# Patient Record
Sex: MALE | Race: WHITE
[De-identification: names, ages, dates, MRNs, and addresses within clinical notes are randomized per-mention and may not be internally consistent; named-entity substitution may affect disease eponyms.]

---

## 2017-10-01 ENCOUNTER — HOSPITAL ENCOUNTER (EMERGENCY)
Dept: HOSPITAL 92 - ERS | Age: 27
Discharge: HOME | End: 2017-10-01
Payer: SELF-PAY

## 2017-10-01 DIAGNOSIS — S39.012A: ICD-10-CM

## 2017-10-01 DIAGNOSIS — F17.210: ICD-10-CM

## 2017-10-01 DIAGNOSIS — S29.012A: ICD-10-CM
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
## 2017-10-01 DIAGNOSIS — Z87.442: ICD-10-CM

## 2017-10-01 DIAGNOSIS — S16.1XXA: Primary | ICD-10-CM

## 2017-10-01 LAB
ALP SERPL-CCNC: 73 U/L (ref 40–150)
ALT SERPL W P-5'-P-CCNC: 21 U/L (ref 8–55)
ANION GAP SERPL CALC-SCNC: 14 MMOL/L (ref 10–20)
AST SERPL-CCNC: 16 U/L (ref 5–34)
BASOPHILS # BLD AUTO: 0.1 THOU/UL (ref 0–0.2)
BASOPHILS NFR BLD AUTO: 1.5 % (ref 0–1)
BILIRUB SERPL-MCNC: 1.1 MG/DL (ref 0.2–1.2)
BUN SERPL-MCNC: 13 MG/DL (ref 8.9–20.6)
CALCIUM SERPL-MCNC: 9.4 MG/DL (ref 7.8–10.44)
CHLORIDE SERPL-SCNC: 106 MMOL/L (ref 98–107)
CO2 SERPL-SCNC: 21 MMOL/L (ref 22–29)
CREAT CL PREDICTED SERPL C-G-VRATE: 0 ML/MIN (ref 70–130)
EOSINOPHIL # BLD AUTO: 0.1 THOU/UL (ref 0–0.7)
EOSINOPHIL NFR BLD AUTO: 1.1 % (ref 0–10)
GLOBULIN SER CALC-MCNC: 3.3 G/DL (ref 2.4–3.5)
HCT VFR BLD CALC: 45.5 % (ref 42–52)
LYMPHOCYTES # BLD: 2.8 THOU/UL (ref 1.2–3.4)
LYMPHOCYTES NFR BLD AUTO: 44.7 % (ref 21–51)
MONOCYTES # BLD AUTO: 0.5 THOU/UL (ref 0.11–0.59)
MONOCYTES NFR BLD AUTO: 8.6 % (ref 0–10)
NEUTROPHILS # BLD AUTO: 2.8 THOU/UL (ref 1.4–6.5)
RBC # BLD AUTO: 5.03 MILL/UL (ref 4.7–6.1)
WBC # BLD AUTO: 6.3 THOU/UL (ref 4.8–10.8)

## 2017-10-01 PROCEDURE — 74177 CT ABD & PELVIS W/CONTRAST: CPT

## 2017-10-01 PROCEDURE — 71260 CT THORAX DX C+: CPT

## 2017-10-01 PROCEDURE — 86901 BLOOD TYPING SEROLOGIC RH(D): CPT

## 2017-10-01 PROCEDURE — 96361 HYDRATE IV INFUSION ADD-ON: CPT

## 2017-10-01 PROCEDURE — 80053 COMPREHEN METABOLIC PANEL: CPT

## 2017-10-01 PROCEDURE — 96375 TX/PRO/DX INJ NEW DRUG ADDON: CPT

## 2017-10-01 PROCEDURE — 96374 THER/PROPH/DIAG INJ IV PUSH: CPT

## 2017-10-01 PROCEDURE — 86850 RBC ANTIBODY SCREEN: CPT

## 2017-10-01 PROCEDURE — 36415 COLL VENOUS BLD VENIPUNCTURE: CPT

## 2017-10-01 PROCEDURE — 70450 CT HEAD/BRAIN W/O DYE: CPT

## 2017-10-01 PROCEDURE — 86900 BLOOD TYPING SEROLOGIC ABO: CPT

## 2017-10-01 PROCEDURE — 72125 CT NECK SPINE W/O DYE: CPT

## 2017-10-01 PROCEDURE — 85025 COMPLETE CBC W/AUTO DIFF WBC: CPT
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
## 2019-01-08 ENCOUNTER — HOSPITAL ENCOUNTER (EMERGENCY)
Dept: HOSPITAL 92 - SCSER | Age: 29
Discharge: HOME | End: 2019-01-08
Payer: SELF-PAY

## 2019-01-08 DIAGNOSIS — F17.210: ICD-10-CM

## 2019-01-08 DIAGNOSIS — K02.9: ICD-10-CM

## 2019-01-08 DIAGNOSIS — K04.7: Primary | ICD-10-CM

## 2019-01-08 DIAGNOSIS — K03.81: ICD-10-CM

## 2019-01-08 PROCEDURE — 96372 THER/PROPH/DIAG INJ SC/IM: CPT

## 2020-10-04 ENCOUNTER — HOSPITAL ENCOUNTER (EMERGENCY)
Dept: HOSPITAL 92 - ERS | Age: 30
LOS: 1 days | Discharge: HOME | End: 2020-10-05
Payer: SELF-PAY

## 2020-10-04 DIAGNOSIS — F17.210: ICD-10-CM

## 2020-10-04 DIAGNOSIS — E86.0: Primary | ICD-10-CM

## 2020-10-04 DIAGNOSIS — R51.9: ICD-10-CM

## 2020-10-04 DIAGNOSIS — R11.2: ICD-10-CM

## 2020-10-04 PROCEDURE — 82550 ASSAY OF CK (CPK): CPT

## 2020-10-04 PROCEDURE — 93005 ELECTROCARDIOGRAM TRACING: CPT

## 2020-10-04 PROCEDURE — 80053 COMPREHEN METABOLIC PANEL: CPT

## 2020-10-04 PROCEDURE — 96375 TX/PRO/DX INJ NEW DRUG ADDON: CPT

## 2020-10-04 PROCEDURE — 85025 COMPLETE CBC W/AUTO DIFF WBC: CPT

## 2020-10-04 PROCEDURE — 83690 ASSAY OF LIPASE: CPT

## 2020-10-04 PROCEDURE — 96361 HYDRATE IV INFUSION ADD-ON: CPT

## 2020-10-04 PROCEDURE — 96365 THER/PROPH/DIAG IV INF INIT: CPT

## 2020-10-05 LAB
ALBUMIN SERPL BCG-MCNC: 4.4 G/DL (ref 3.5–5)
ALP SERPL-CCNC: 91 U/L (ref 40–110)
ALT SERPL W P-5'-P-CCNC: 17 U/L (ref 8–55)
ANION GAP SERPL CALC-SCNC: 16 MMOL/L (ref 10–20)
AST SERPL-CCNC: 15 U/L (ref 5–34)
BASOPHILS # BLD AUTO: 0.1 THOU/UL (ref 0–0.2)
BASOPHILS NFR BLD AUTO: 0.8 % (ref 0–1)
BILIRUB SERPL-MCNC: 0.8 MG/DL (ref 0.2–1.2)
BUN SERPL-MCNC: 7 MG/DL (ref 8.9–20.6)
CALCIUM SERPL-MCNC: 9.4 MG/DL (ref 7.8–10.44)
CHLORIDE SERPL-SCNC: 100 MMOL/L (ref 98–107)
CO2 SERPL-SCNC: 24 MMOL/L (ref 22–29)
CREAT CL PREDICTED SERPL C-G-VRATE: 0 ML/MIN (ref 70–130)
EOSINOPHIL # BLD AUTO: 0 THOU/UL (ref 0–0.7)
EOSINOPHIL NFR BLD AUTO: 0.2 % (ref 0–10)
GLOBULIN SER CALC-MCNC: 3.4 G/DL (ref 2.4–3.5)
GLUCOSE SERPL-MCNC: 97 MG/DL (ref 70–105)
HGB BLD-MCNC: 16.8 G/DL (ref 14–18)
LIPASE SERPL-CCNC: 12 U/L (ref 8–78)
LYMPHOCYTES # BLD: 1.9 THOU/UL (ref 1.2–3.4)
LYMPHOCYTES NFR BLD AUTO: 12 % (ref 21–51)
MCH RBC QN AUTO: 30.2 PG (ref 27–31)
MCV RBC AUTO: 88.5 FL (ref 78–98)
MONOCYTES # BLD AUTO: 1 THOU/UL (ref 0.11–0.59)
MONOCYTES NFR BLD AUTO: 6.1 % (ref 0–10)
NEUTROPHILS # BLD AUTO: 12.9 THOU/UL (ref 1.4–6.5)
NEUTROPHILS NFR BLD AUTO: 80.9 % (ref 42–75)
PLATELET # BLD AUTO: 225 THOU/UL (ref 130–400)
POTASSIUM SERPL-SCNC: 3.8 MMOL/L (ref 3.5–5.1)
RBC # BLD AUTO: 5.55 MILL/UL (ref 4.7–6.1)
SODIUM SERPL-SCNC: 136 MMOL/L (ref 136–145)
WBC # BLD AUTO: 15.9 THOU/UL (ref 4.8–10.8)

## 2025-06-29 ENCOUNTER — HOSPITAL ENCOUNTER (INPATIENT)
Dept: HOSPITAL 92 - ERS | Age: 35
LOS: 2 days | Discharge: HOME | DRG: 399 | End: 2025-07-01
Attending: COLON & RECTAL SURGERY | Admitting: COLON & RECTAL SURGERY
Payer: SELF-PAY

## 2025-06-29 VITALS — BODY MASS INDEX: 21.7 KG/M2

## 2025-06-29 DIAGNOSIS — F12.90: ICD-10-CM

## 2025-06-29 DIAGNOSIS — F17.210: ICD-10-CM

## 2025-06-29 DIAGNOSIS — Z87.442: ICD-10-CM

## 2025-06-29 DIAGNOSIS — K35.32: Primary | ICD-10-CM

## 2025-06-29 LAB
ALBUMIN SERPL BCG-MCNC: 3.8 G/DL (ref 3.1–4.5)
ALBUMIN/GLOB SERPL: 1 G/DL (ref 1.2–2.2)
ALP SERPL-CCNC: 88 U/L (ref 40–110)
ALT SERPL W P-5'-P-CCNC: 16 U/L (ref ?–45)
ANION GAP SERPL CALC-SCNC: 18 MMOL/L (ref 10–20)
ANISOCYTOSIS BLD QL SMEAR: (no result) (100X)
AST SERPL-CCNC: 15 U/L (ref 11–34)
AUER BODIES BLD QL SMEAR: (no result)
BACTERIA UR QL AUTO: (no result) HPF
BASO STIPL BLD QL SMEAR: (no result) (100X)
BASOPHILS # BLD AUTO: 0.05 10X3/UL (ref 0–0.2)
BASOPHILS NFR BLD AUTO: 0.2 % (ref 0–1)
BASOPHILS NFR BLD MANUAL: (no result) % (ref 0–2)
BILIRUB SERPL-MCNC: 1.7 MG/DL (ref 0.3–1.2)
BILIRUB UR QL STRIP.AUTO: NEGATIVE
BITE CELLS BLD QL SMEAR: (no result) (100X)
BLASTS NFR BLD MANUAL: (no result) % (ref 0–0)
BLISTER CELLS BLD QL SMEAR: (no result) (100X)
BUN SERPL-MCNC: 13 MG/DL (ref 8.9–20.6)
BURR CELLS BLD QL SMEAR: (no result) (100X)
BURR CELLS BLD QL SMEAR: (no result) (100X)
CABOT RINGS BLD QL SMEAR: (no result) (100X)
CALCIUM SERPL-MCNC: 9.3 MG/DL (ref 7.8–10.44)
CELLAVISION OPERATOR ID: (no result)
CELLULAR CAST: (no result) LPF
CHLORIDE SERPL-SCNC: 96 MMOL/L (ref 98–107)
CLARITY UR: CLEAR
CO2 SERPL-SCNC: 24 MMOL/L (ref 22–29)
COLOR UR: (no result)
COMM CRITICAL RESULTS DOC: (no result)
CREAT CL PREDICTED SERPL C-G-VRATE: 0 ML/MIN (ref 70–130)
CREAT SERPL-MCNC: 1.14 MG/DL (ref 0.6–1.12)
CRYSTALS UR QL AUTO: (no result) HPF
DACRYOCYTES BLD QL SMEAR: (no result) (100X)
DELETE AUTO DIFF??: (no result)
DOHLE BOD BLD QL SMEAR: (no result)
EGFRCR SERPLBLD CKD-EPI 2021: 86 ML/MIN/{1.73_M2}
ELLIPTOCYTES BLD QL SMEAR: (no result) (100X)
EOSINOPHIL # BLD AUTO: (no result) 10X3/UL (ref 0–0.7)
EOSINOPHIL NFR BLD AUTO: 0 % (ref 0–10)
EOSINOPHIL NFR BLD MANUAL: (no result) % (ref 0–10)
EPITHELIAL CAST: (no result) LPF
ERYTHROCYTE [DISTWIDTH] IN BLOOD BY AUTOMATED COUNT: 12.9 % (ref 11.5–14.5)
FATTY CAST: (no result) LPF
GIANT PLATELETS BLD QL SMEAR: (no result) HPF (ref 0–5)
GLOBULIN SER CALC-MCNC: 4 G/DL (ref 2.4–3.5)
GLUCOSE SERPL-MCNC: 105 MG/DL (ref 70–105)
GLUCOSE UR STRIP-MCNC: NORMAL MG/DL
HCT VFR BLD CALC: 45.8 % (ref 42–52)
HELMET CELLS BLD QL SMEAR: (no result) (100X)
HGB BLD-MCNC: 15.7 G/DL (ref 14–18)
HGB UR QL STRIP.AUTO: NEGATIVE
HOWELL-JOLLY BOD BLD QL SMEAR: (no result) (100X)
HYPOCHROMIA BLD QL SMEAR: (no result) (100X)
KETONES UR STRIP.AUTO-MCNC: 20 MG/DL
LACTATE SERPL-SCNC: 1.08 MMOL/L (ref 0.5–2.2)
LEUKOCYTE ESTERASE UR QL STRIP.AUTO: NEGATIVE LEU/UL
LG PLATELETS BLD QL SMEAR: (no result)
LIPASE SERPL-CCNC: 11 U/L (ref 8–78)
LYMPHOCYTES NFR BLD AUTO: 5.1 % (ref 21–51)
LYMPHOCYTES NFR BLD MANUAL: (no result) % (ref 21–51)
Lab: (no result)
Lab: (no result) (100X)
Lab: (no result) LPF
Lab: (no result) LPF
Lab: NO
MACROCYTES BLD QL SMEAR: (no result) (100X)
MANUAL DIF COMMENT BLD-IMP: (no result)
MANUAL DIF COMMENT BLD-IMP: (no result)
MANUAL DIFF??: (no result)
MANUAL MICROSCOPIC REVIEWED?: (no result)
MCH RBC QN AUTO: 28.3 PG (ref 27–31)
MCHC RBC AUTO-ENTMCNC: 34.3 G/DL (ref 32–36)
MCV RBC AUTO: 82.5 FL (ref 78–98)
METAMYELOCYTES NFR BLD MANUAL: (no result) % (ref 0–0)
MICROCYTES BLD QL SMEAR: (no result) (100X)
MONOCYTES # BLD AUTO: 1.16 10X3/UL (ref 0.11–0.59)
MONOCYTES NFR BLD AUTO: 5.4 % (ref 0–10)
MONOCYTES NFR BLD MANUAL: (no result) % (ref 0–10)
MUCOUS THREADS UR QL AUTO: (no result) LPF
MYELOCYTES NFR BLD MANUAL: (no result) % (ref 0–0)
NEUTROPHILS # BLD AUTO: 19.06 10X3/UL (ref 1.4–6.5)
NEUTROPHILS NFR BLD AUTO: 88.7 % (ref 42–75)
NEUTS BAND NFR BLD MANUAL: (no result) % (ref 5–11)
NEUTS HYPERSEG BLD QL SMEAR: (no result)
NEUTS SEG NFR BLD MANUAL: (no result) % (ref 42–75)
NITRITE UR QL STRIP: NEGATIVE
NON-SQ EPI CELLS #/AREA URNS AUTO: (no result) HPF
NON-SQ EPI CELLS #/AREA URNS AUTO: (no result) HPF
NRBC # BLD: (no result) %
OTHER CASTS: (no result) LPF
OVAL FAT BODIES #/AREA URNS AUTO: (no result) HPF
OVALOCYTES BLD QL SMEAR: (no result) (100X)
PAPPENHEIMER BOD BLD QL SMEAR: (no result) (100X)
PH UR STRIP.AUTO: 8.5 [PH] (ref 5–9)
PLASMACOID LYMPHS NFR BLD MANUAL: (no result) % (ref 0–0)
PLATELET # BLD AUTO: 196 10X3/UL (ref 130–400)
PLATELET BLD QL SMEAR: (no result)
PLATELET CLUMP BLD QL SMEAR: (no result)
PLATELET SATEL BLD QL SMEAR: (no result)
PMV BLD AUTO: 10.4 FL (ref 7.4–10.4)
POIKILOCYTOSIS BLD QL SMEAR: (no result) (100X)
POLYCHROMASIA BLD QL SMEAR: (no result) (100X)
POTASSIUM SERPL-SCNC: 3.6 MMOL/L (ref 3.5–5.1)
PROMYELOCYTES NFR BLD MANUAL: (no result) % (ref 0–0)
PROT SERPL-MCNC: 7.8 G/DL (ref 6–8.3)
PROT UR QL SSA: (no result) MG/DL
PROT UR STRIP.AUTO-MCNC: 70 MG/DL
RBC # BLD AUTO: 5.55 MILL/UL (ref 4.7–6.1)
RBC MORPH BLD: (no result)
REFLEX FOR REVIEW??: (no result)
ROULEAUX BLD QL SMEAR: (no result) (100X)
SCHISTOCYTES BLD QL SMEAR: (no result) (100X)
SICKLE CELLS BLD QL SMEAR: (no result) (100X)
SMALL PLATELETS BLD QL SMEAR: (no result) HPF (ref 0–15)
SMUDGE CELLS BLD QL SMEAR: (no result)
SODIUM SERPL-SCNC: 134 MMOL/L (ref 136–145)
SP GR UR STRIP: 1.03 (ref 1–1.04)
SPERM UR QL AUTO: (no result) HPF
SPHEROCYTES BLD QL SMEAR: (no result) (100X)
SQUAMOUS URNS QL MICRO: (no result) HPF (ref 0–3)
STOMATOCYTES BLD QL SMEAR: (no result) (100X)
T VAGINALIS URNS QL MICRO: (no result) HPF
TARGETS BLD QL SMEAR: (no result) (100X)
TOTAL CELLS COUNTED BLD: (no result)
TOXIC GRANULES BLD QL SMEAR: (no result)
URNS CMNT MICRO: (no result)
UROBILINOGEN UR STRIP-ACNC: 8 MG/DL (ref ?–2)
VARIANT LYMPHS NFR BLD MANUAL: (no result) % (ref 0–10)
WAXY CAST: (no result) LPF
WBC # BLD AUTO: 21.51 10X3/UL (ref 4.8–10.8)
WBC OTHER NFR BLD MANUAL: (no result) %
WBC TOXIC VACUOLES BLD QL SMEAR: (no result)
WBC UR QL AUTO: (no result) HPF (ref 0–3)
WHITE BLOOD CELL CAST: (no result) LPF
YEAST # UR AUTO: (no result) HPF
YEAST # UR AUTO: (no result) HPF

## 2025-06-29 PROCEDURE — 0DTJ4ZZ RESECTION OF APPENDIX, PERCUTANEOUS ENDOSCOPIC APPROACH: ICD-10-PCS | Performed by: COLON & RECTAL SURGERY

## 2025-06-29 PROCEDURE — 81001 URINALYSIS AUTO W/SCOPE: CPT

## 2025-06-29 PROCEDURE — 96376 TX/PRO/DX INJ SAME DRUG ADON: CPT

## 2025-06-29 PROCEDURE — 87086 URINE CULTURE/COLONY COUNT: CPT

## 2025-06-29 PROCEDURE — 80048 BASIC METABOLIC PNL TOTAL CA: CPT

## 2025-06-29 PROCEDURE — A4649 SURGICAL SUPPLIES: HCPCS

## 2025-06-29 PROCEDURE — 96375 TX/PRO/DX INJ NEW DRUG ADDON: CPT

## 2025-06-29 PROCEDURE — 80053 COMPREHEN METABOLIC PANEL: CPT

## 2025-06-29 PROCEDURE — 88304 TISSUE EXAM BY PATHOLOGIST: CPT

## 2025-06-29 PROCEDURE — 36415 COLL VENOUS BLD VENIPUNCTURE: CPT

## 2025-06-29 PROCEDURE — 85025 COMPLETE CBC W/AUTO DIFF WBC: CPT

## 2025-06-29 PROCEDURE — 83605 ASSAY OF LACTIC ACID: CPT

## 2025-06-29 PROCEDURE — 87040 BLOOD CULTURE FOR BACTERIA: CPT

## 2025-06-29 PROCEDURE — 83690 ASSAY OF LIPASE: CPT

## 2025-06-29 PROCEDURE — 74177 CT ABD & PELVIS W/CONTRAST: CPT

## 2025-06-29 PROCEDURE — 96365 THER/PROPH/DIAG IV INF INIT: CPT

## 2025-06-29 RX ADMIN — KETOROLAC TROMETHAMINE SCH MG: 30 INJECTION, SOLUTION INTRAMUSCULAR at 17:32

## 2025-06-29 RX ADMIN — FAMOTIDINE SCH: 10 INJECTION, SOLUTION INTRAVENOUS at 21:24

## 2025-06-29 RX ADMIN — NICOTINE SCH: 14 PATCH TRANSDERMAL at 15:36

## 2025-06-29 RX ADMIN — SODIUM CHLORIDE, POTASSIUM CHLORIDE, SODIUM LACTATE AND CALCIUM CHLORIDE SCH MLS: 600; 310; 30; 20 INJECTION, SOLUTION INTRAVENOUS at 15:36

## 2025-06-29 RX ADMIN — MORPHINE SULFATE PRN MG: 2 INJECTION, SOLUTION INTRAMUSCULAR; INTRAVENOUS at 15:36

## 2025-06-29 RX ADMIN — PIPERACILLIN SODIUM AND TAZOBACTAM SODIUM SCH MLS: 3; .375 INJECTION, POWDER, LYOPHILIZED, FOR SOLUTION INTRAVENOUS at 15:36

## 2025-06-29 RX ADMIN — HYDROCODONE BITARTRATE AND ACETAMINOPHEN PRN TAB: 5; 325 TABLET ORAL at 19:00

## 2025-06-29 RX ADMIN — FAMOTIDINE SCH MG: 20 TABLET ORAL at 19:13

## 2025-06-30 LAB
ANION GAP SERPL CALC-SCNC: 9 MMOL/L (ref 10–20)
ANISOCYTOSIS BLD QL SMEAR: (no result) (100X)
AUER BODIES BLD QL SMEAR: (no result)
BASO STIPL BLD QL SMEAR: (no result) (100X)
BASOPHILS # BLD AUTO: (no result) 10X3/UL (ref 0–0.2)
BASOPHILS NFR BLD AUTO: 0.1 % (ref 0–1)
BASOPHILS NFR BLD MANUAL: (no result) % (ref 0–2)
BITE CELLS BLD QL SMEAR: (no result) (100X)
BLASTS NFR BLD MANUAL: (no result) % (ref 0–0)
BLISTER CELLS BLD QL SMEAR: (no result) (100X)
BUN SERPL-MCNC: 14 MG/DL (ref 8.9–20.6)
BURR CELLS BLD QL SMEAR: (no result) (100X)
BURR CELLS BLD QL SMEAR: (no result) (100X)
CABOT RINGS BLD QL SMEAR: (no result) (100X)
CALCIUM SERPL-MCNC: 8.2 MG/DL (ref 7.8–10.44)
CELLAVISION OPERATOR ID: (no result)
CHLORIDE SERPL-SCNC: 104 MMOL/L (ref 98–107)
CO2 SERPL-SCNC: 27 MMOL/L (ref 22–29)
COMM CRITICAL RESULTS DOC: (no result)
COMM CRITICAL RESULTS DOC: (no result)
CREAT CL PREDICTED SERPL C-G-VRATE: 104 ML/MIN (ref 70–130)
CREAT SERPL-MCNC: 1.02 MG/DL (ref 0.6–1.12)
DACRYOCYTES BLD QL SMEAR: (no result) (100X)
DELETE AUTO DIFF??: (no result)
DOHLE BOD BLD QL SMEAR: (no result)
EGFRCR SERPLBLD CKD-EPI 2021: 98 ML/MIN/{1.73_M2}
ELLIPTOCYTES BLD QL SMEAR: (no result) (100X)
EOSINOPHIL # BLD AUTO: (no result) 10X3/UL (ref 0–0.7)
EOSINOPHIL NFR BLD AUTO: 0 % (ref 0–10)
EOSINOPHIL NFR BLD MANUAL: (no result) % (ref 0–10)
ERYTHROCYTE [DISTWIDTH] IN BLOOD BY AUTOMATED COUNT: 13.2 % (ref 11.5–14.5)
GIANT PLATELETS BLD QL SMEAR: (no result) HPF (ref 0–5)
GLUCOSE SERPL-MCNC: 101 MG/DL (ref 70–105)
HCT VFR BLD CALC: 38.6 % (ref 42–52)
HELMET CELLS BLD QL SMEAR: (no result) (100X)
HGB BLD-MCNC: 12.8 G/DL (ref 14–18)
HOWELL-JOLLY BOD BLD QL SMEAR: (no result) (100X)
HYPOCHROMIA BLD QL SMEAR: (no result) (100X)
LG PLATELETS BLD QL SMEAR: (no result)
LYMPHOCYTES NFR BLD AUTO: 6.6 % (ref 21–51)
LYMPHOCYTES NFR BLD MANUAL: (no result) % (ref 21–51)
Lab: (no result) (100X)
MACROCYTES BLD QL SMEAR: (no result) (100X)
MANUAL DIF COMMENT BLD-IMP: (no result)
MANUAL DIF COMMENT BLD-IMP: (no result)
MANUAL DIFF??: (no result)
MCH RBC QN AUTO: 28.6 PG (ref 27–31)
MCHC RBC AUTO-ENTMCNC: 33.2 G/DL (ref 32–36)
MCV RBC AUTO: 86.2 FL (ref 78–98)
METAMYELOCYTES NFR BLD MANUAL: (no result) % (ref 0–0)
MICROCYTES BLD QL SMEAR: (no result) (100X)
MONOCYTES # BLD AUTO: 1.39 10X3/UL (ref 0.11–0.59)
MONOCYTES NFR BLD AUTO: 9.3 % (ref 0–10)
MONOCYTES NFR BLD MANUAL: (no result) % (ref 0–10)
MYELOCYTES NFR BLD MANUAL: (no result) % (ref 0–0)
NEUTROPHILS # BLD AUTO: 12.51 10X3/UL (ref 1.4–6.5)
NEUTROPHILS NFR BLD AUTO: 83.5 % (ref 42–75)
NEUTS BAND NFR BLD MANUAL: (no result) % (ref 5–11)
NEUTS HYPERSEG BLD QL SMEAR: (no result)
NEUTS SEG NFR BLD MANUAL: (no result) % (ref 42–75)
NRBC # BLD: (no result) %
OVALOCYTES BLD QL SMEAR: (no result) (100X)
PAPPENHEIMER BOD BLD QL SMEAR: (no result) (100X)
PLASMACOID LYMPHS NFR BLD MANUAL: (no result) % (ref 0–0)
PLATELET # BLD AUTO: 160 10X3/UL (ref 130–400)
PLATELET BLD QL SMEAR: (no result)
PLATELET CLUMP BLD QL SMEAR: (no result)
PLATELET SATEL BLD QL SMEAR: (no result)
PMV BLD AUTO: 10.3 FL (ref 7.4–10.4)
POIKILOCYTOSIS BLD QL SMEAR: (no result) (100X)
POLYCHROMASIA BLD QL SMEAR: (no result) (100X)
POTASSIUM SERPL-SCNC: 4.1 MMOL/L (ref 3.5–5.1)
PROMYELOCYTES NFR BLD MANUAL: (no result) % (ref 0–0)
RBC # BLD AUTO: 4.48 MILL/UL (ref 4.7–6.1)
RBC MORPH BLD: (no result)
REFLEX FOR REVIEW??: (no result)
ROULEAUX BLD QL SMEAR: (no result) (100X)
SCHISTOCYTES BLD QL SMEAR: (no result) (100X)
SICKLE CELLS BLD QL SMEAR: (no result) (100X)
SMALL PLATELETS BLD QL SMEAR: (no result) HPF (ref 0–15)
SMUDGE CELLS BLD QL SMEAR: (no result)
SODIUM SERPL-SCNC: 136 MMOL/L (ref 136–145)
SPHEROCYTES BLD QL SMEAR: (no result) (100X)
STOMATOCYTES BLD QL SMEAR: (no result) (100X)
TARGETS BLD QL SMEAR: (no result) (100X)
TOTAL CELLS COUNTED BLD: (no result)
TOXIC GRANULES BLD QL SMEAR: (no result)
VARIANT LYMPHS NFR BLD MANUAL: (no result) % (ref 0–10)
WBC # BLD AUTO: 14.99 10X3/UL (ref 4.8–10.8)
WBC OTHER NFR BLD MANUAL: (no result) %
WBC TOXIC VACUOLES BLD QL SMEAR: (no result)

## 2025-06-30 RX ADMIN — ACETAMINOPHEN PRN MG: 325 TABLET ORAL at 21:25

## 2025-06-30 RX ADMIN — DOCUSATE SODIUM 50 MG AND SENNOSIDES 8.6 MG SCH TAB: 8.6; 5 TABLET, FILM COATED ORAL at 14:49

## 2025-06-30 RX ADMIN — TRAMADOL HYDROCHLORIDE PRN MG: 50 TABLET, COATED ORAL at 21:27

## 2025-06-30 RX ADMIN — DOCUSATE SODIUM 50 MG AND SENNOSIDES 8.6 MG SCH TAB: 8.6; 5 TABLET, FILM COATED ORAL at 21:25

## 2025-06-30 RX ADMIN — NICOTINE SCH MG: 14 PATCH TRANSDERMAL at 14:49

## 2025-06-30 RX ADMIN — METHOCARBAMOL PRN MG: 500 TABLET ORAL at 22:30

## 2025-07-01 VITALS — SYSTOLIC BLOOD PRESSURE: 122 MMHG | TEMPERATURE: 98.1 F | DIASTOLIC BLOOD PRESSURE: 74 MMHG
